# Patient Record
Sex: MALE | Race: BLACK OR AFRICAN AMERICAN | NOT HISPANIC OR LATINO | Employment: STUDENT | URBAN - METROPOLITAN AREA
[De-identification: names, ages, dates, MRNs, and addresses within clinical notes are randomized per-mention and may not be internally consistent; named-entity substitution may affect disease eponyms.]

---
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
## 2022-05-09 NOTE — TELEPHONE ENCOUNTER
Called back patient for follow up visit 3 days ago for Covid testing.  Gave patient NEGATIVE test result.  Patient feeling well, currently asymptomatic.  Recommended continuing medication as needed, follow up with us if symptoms return.  
today
